# Patient Record
Sex: MALE | URBAN - METROPOLITAN AREA
[De-identification: names, ages, dates, MRNs, and addresses within clinical notes are randomized per-mention and may not be internally consistent; named-entity substitution may affect disease eponyms.]

---

## 2019-08-27 NOTE — PATIENT DISCUSSION
CHRONIC CONJUNCTIVITIS, OS: PRESCRIBE PREDNISOLONE ACETATE TID OS FOR 1 WEEK, THEN STOP. RETURN FOR FOLLOW UP IN 1 WEEK TO MONITOR FOR IMPROVEMENT OR SOONER IF SYMPTOMS WORSEN. DISCUSSED POSSIBLE NEED FOR CULTURE IF NO IMPROVEMENT. PATIENT DENIES ANY POSSIBILITY OF CONTACT WITH SEXUALLY TRANSMITTED DISEASES. STRESSED IMPORTANCE OF HAND WASHING AND AVOIDING TOUCHING THE EYE.

## 2019-08-27 NOTE — PATIENT DISCUSSION
New Prescription: prednisolone acetate (PF) (prednisolone acetate (pf)): drops,suspension: 1% 1 drop three times a day as directed into left eye 08-

## 2019-09-09 NOTE — PATIENT DISCUSSION
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 PRESCRIBED DAILY USE OF REFRESH OPTIVE ADVANCED ARTIFICIAL TEARS BID - QID, OU AND NIGHTLY REFRESH GEL OR REFRESH PM QHS OS. RETURN FOR FOLLOW-UP AS SCHEDULED OR SOONER IF SYMPTOMS WORSEN.

## 2020-10-08 ENCOUNTER — IMPORTED ENCOUNTER (OUTPATIENT)
Dept: URBAN - METROPOLITAN AREA CLINIC 1 | Facility: CLINIC | Age: 75
End: 2020-10-08

## 2020-10-08 PROBLEM — H25.813: Noted: 2020-10-08

## 2020-10-08 PROBLEM — H04.123: Noted: 2020-10-08

## 2020-10-08 PROCEDURE — 92015 DETERMINE REFRACTIVE STATE: CPT

## 2020-10-08 PROCEDURE — 92004 COMPRE OPH EXAM NEW PT 1/>: CPT

## 2020-10-08 NOTE — PATIENT DISCUSSION
1.  Cataract OU: Observe for now without intervention. The patient was advised to contact us if any change or worsening of vision2. Dry Eyes OU -Recommend ATs BID OU routinely. MRX for glasses given. Return for an appointment in 1 year 30/glare with Dr. Rivas Skinner.

## 2021-10-12 ENCOUNTER — IMPORTED ENCOUNTER (OUTPATIENT)
Dept: URBAN - METROPOLITAN AREA CLINIC 1 | Facility: CLINIC | Age: 76
End: 2021-10-12

## 2021-10-12 PROBLEM — H25.813: Noted: 2021-10-12

## 2021-10-12 PROBLEM — H04.123: Noted: 2021-10-12

## 2021-10-12 PROBLEM — H18.413: Noted: 2021-10-12

## 2021-10-12 PROCEDURE — 92015 DETERMINE REFRACTIVE STATE: CPT

## 2021-10-12 PROCEDURE — 99214 OFFICE O/P EST MOD 30 MIN: CPT

## 2021-10-12 NOTE — PATIENT DISCUSSION
1.  Cataract OU --  Visually Significant secondary to glare OU discussed the risks benefits alternatives and limitations of cataract surgery. The patient stated a full understanding and a desire to proceed with the procedure. The patient will need to return for preop appointment with cataract measurements and to have any additional questions answered and start pre-operative eye drops as directed. *Consider monofocal Toric for distance OU. Phaco PCL OS then OD Otherwise follow-up2. Dry Eyes OU -- Recommend ATs BID OU routinely. 3. Arcus OU -- Observe. Return for an appointment AScan/HP with Dr. Opal Green.

## 2021-10-25 ENCOUNTER — IMPORTED ENCOUNTER (OUTPATIENT)
Dept: URBAN - METROPOLITAN AREA CLINIC 1 | Facility: CLINIC | Age: 76
End: 2021-10-25

## 2021-10-25 PROBLEM — H25.812: Noted: 2021-10-25

## 2021-10-25 PROCEDURE — 92136 OPHTHALMIC BIOMETRY: CPT

## 2021-10-25 NOTE — PATIENT DISCUSSION
1. Cataract OS:  Visually Significant secondary to glare discussed the risks benefits alternatives and limitations of cataract surgery. The patient stated a full understanding and a desire to proceed with the procedure. Discussed with patient if PO Gtts are more than $120 for all three combined when filling at their Pharmacy please call our office to request generic substitutions. Phaco PCL OS  Lifestyle Questionnaire Completed. Return for an appointment for Return as scheduled with Dr. Oren Herron.

## 2021-11-03 ENCOUNTER — IMPORTED ENCOUNTER (OUTPATIENT)
Dept: URBAN - METROPOLITAN AREA CLINIC 1 | Facility: CLINIC | Age: 76
End: 2021-11-03

## 2021-11-04 ENCOUNTER — IMPORTED ENCOUNTER (OUTPATIENT)
Dept: URBAN - METROPOLITAN AREA CLINIC 1 | Facility: CLINIC | Age: 76
End: 2021-11-04

## 2021-11-04 PROBLEM — Z96.1: Noted: 2021-11-04

## 2021-11-04 PROCEDURE — 99024 POSTOP FOLLOW-UP VISIT: CPT

## 2021-11-04 NOTE — PATIENT DISCUSSION
1. POD#1 CE/IOL OS (Toric) doing well. *Dextenza*Use Tobradex ST BID OS Prolensa Qdaily OS: Use all three gtts through completion of PO gtt chart regimen/ Per our instructions given to patient.   Post op Warnings Reiterated RTC as scheduled

## 2021-11-15 ENCOUNTER — IMPORTED ENCOUNTER (OUTPATIENT)
Dept: URBAN - METROPOLITAN AREA CLINIC 1 | Facility: CLINIC | Age: 76
End: 2021-11-15

## 2021-11-15 PROBLEM — H25.811: Noted: 2021-11-15

## 2021-11-15 PROCEDURE — 92136 OPHTHALMIC BIOMETRY: CPT

## 2021-11-15 NOTE — PATIENT DISCUSSION
1.  Cataract OD: Visually Significant secondary to glare discussed the risks benefits alternatives and limitations of cataract surgery. The patient stated a full understanding and a desire to proceed with the procedure. Discussed with patient if PO Gtts are more than $120 for all three combined when filling at their Pharmacy please call our office to request generic substitutions. *Dextenza*Phaco PCL OD 2. POW#1  CE/IOL OS (TORIC) doing well. *Dextenza*Use gtts through completion of PO gtt chart regimen.  F/u as scheduled 2nd eye
Breathing spontaneous and unlabored. Breath sounds clear and equal bilaterally with regular rhythm.

## 2021-11-19 ENCOUNTER — IMPORTED ENCOUNTER (OUTPATIENT)
Dept: URBAN - METROPOLITAN AREA CLINIC 1 | Facility: CLINIC | Age: 76
End: 2021-11-19

## 2021-11-20 ENCOUNTER — IMPORTED ENCOUNTER (OUTPATIENT)
Dept: URBAN - METROPOLITAN AREA CLINIC 1 | Facility: CLINIC | Age: 76
End: 2021-11-20

## 2021-11-20 PROBLEM — Z96.1: Noted: 2021-11-20

## 2021-11-20 PROCEDURE — 99024 POSTOP FOLLOW-UP VISIT: CPT

## 2021-11-20 NOTE — PATIENT DISCUSSION
1. POD#1 Phaco/ PCL OD (Toric) -- doing well. *Dexenza in placeUse Tobradex ST BID OD & Prolensa QD OD: Use all three gtts through completion of PO gtt chart regimen/ Per our instructions given. Post op Warnings Reiterated 2.  POW#3 Phaco/ PCL OS (Toric)- doing well Use Prolensa QD OSRTC as scheduled

## 2021-12-14 ENCOUNTER — IMPORTED ENCOUNTER (OUTPATIENT)
Dept: URBAN - METROPOLITAN AREA CLINIC 1 | Facility: CLINIC | Age: 76
End: 2021-12-14

## 2021-12-14 PROBLEM — Z09: Noted: 2021-12-14

## 2021-12-14 PROCEDURE — 99024 POSTOP FOLLOW-UP VISIT: CPT

## 2021-12-14 NOTE — PATIENT DISCUSSION
POW#3 Phaco/ PCL Toric OUFinish PO meds per PO gtt schedule MRX for glasses givenReturn for an appointment in October for a 27 with Dr. Beni Calrk.

## 2022-04-02 ASSESSMENT — VISUAL ACUITY
OD_SC: 20/50
OS_CC: 20/100
OD_GLARE: 20/60
OD_CC: 20/70
OD_GLARE: 20/60
OD_GLARE: 20/60
OS_CC: 20/20-1
OD_CC: J1
OD_SC: J2
OS_SC: 20/20
OS_GLARE: 20/80
OD_GLARE: 20/60
OD_CC: 20/20-2
OS_GLARE: 20/50
OS_CC: J2
OS_CC: J1
OS_CC: 20/20-2
OD_SC: 20/40
OD_CC: J3
OS_GLARE: 20/80
OS_SC: J2
OD_SC: 20/40
OS_CC: J2
OS_SC: 20/40+2
OS_CC: 20/20-1
OD_CC: 20/20
OD_CC: J1
OS_CC: 20/100
OD_CC: 20/70
OS_CC: 20/20
OS_SC: 20/40+2
OD_CC: 20/70

## 2022-04-02 ASSESSMENT — KERATOMETRY
OD_K2POWER_DIOPTERS: 45.00
OD_AXISANGLE2_DEGREES: 054
OS_K1POWER_DIOPTERS: 44.00
OD_AXISANGLE_DEGREES: 144
OS_K2POWER_DIOPTERS: 44.75
OS_AXISANGLE_DEGREES: 025
OS_AXISANGLE2_DEGREES: 115
OD_K1POWER_DIOPTERS: 44.75

## 2022-04-02 ASSESSMENT — TONOMETRY
OS_IOP_MMHG: 18
OS_IOP_MMHG: 16
OD_IOP_MMHG: 18
OS_IOP_MMHG: 14
OS_IOP_MMHG: 16
OD_IOP_MMHG: 18
OD_IOP_MMHG: 19
OD_IOP_MMHG: 19
OS_IOP_MMHG: 17
OD_IOP_MMHG: 18
OS_IOP_MMHG: 16

## 2022-10-21 ENCOUNTER — COMPREHENSIVE EXAM (OUTPATIENT)
Dept: URBAN - METROPOLITAN AREA CLINIC 1 | Facility: CLINIC | Age: 77
End: 2022-10-21

## 2022-10-21 DIAGNOSIS — H26.493: ICD-10-CM

## 2022-10-21 DIAGNOSIS — H18.413: ICD-10-CM

## 2022-10-21 DIAGNOSIS — H04.123: ICD-10-CM

## 2022-10-21 DIAGNOSIS — H35.362: ICD-10-CM

## 2022-10-21 DIAGNOSIS — H43.813: ICD-10-CM

## 2022-10-21 PROCEDURE — 99214 OFFICE O/P EST MOD 30 MIN: CPT

## 2022-10-21 ASSESSMENT — KERATOMETRY
OS_K2POWER_DIOPTERS: 44.75
OS_AXISANGLE_DEGREES: 025
OD_AXISANGLE_DEGREES: 144
OD_K2POWER_DIOPTERS: 45.00
OD_K1POWER_DIOPTERS: 44.75
OS_AXISANGLE2_DEGREES: 115
OD_AXISANGLE2_DEGREES: 054
OS_K1POWER_DIOPTERS: 44.00

## 2022-10-21 ASSESSMENT — VISUAL ACUITY
OD_SC: 20/20
OS_SC: 20/25

## 2022-10-21 ASSESSMENT — TONOMETRY
OS_IOP_MMHG: 12
OD_IOP_MMHG: 12

## 2023-12-04 ENCOUNTER — COMPREHENSIVE EXAM (OUTPATIENT)
Dept: URBAN - METROPOLITAN AREA CLINIC 1 | Facility: CLINIC | Age: 78
End: 2023-12-04

## 2023-12-04 DIAGNOSIS — H04.123: ICD-10-CM

## 2023-12-04 DIAGNOSIS — H01.02A: ICD-10-CM

## 2023-12-04 DIAGNOSIS — H43.813: ICD-10-CM

## 2023-12-04 DIAGNOSIS — H01.02B: ICD-10-CM

## 2023-12-04 DIAGNOSIS — H26.493: ICD-10-CM

## 2023-12-04 DIAGNOSIS — H18.413: ICD-10-CM

## 2023-12-04 DIAGNOSIS — H35.362: ICD-10-CM

## 2023-12-04 PROCEDURE — 92015 DETERMINE REFRACTIVE STATE: CPT

## 2023-12-04 PROCEDURE — 99214 OFFICE O/P EST MOD 30 MIN: CPT

## 2023-12-04 ASSESSMENT — VISUAL ACUITY
OD_SC: J3
OD_SC: 20/20-1
OS_SC: J3
OS_SC: 20/30-2

## 2023-12-04 ASSESSMENT — KERATOMETRY
OD_K2POWER_DIOPTERS: 45.00
OS_K2POWER_DIOPTERS: 44.75
OD_AXISANGLE_DEGREES: 144
OS_AXISANGLE_DEGREES: 025
OS_AXISANGLE2_DEGREES: 115
OD_K1POWER_DIOPTERS: 44.75
OD_AXISANGLE2_DEGREES: 054
OS_K1POWER_DIOPTERS: 44.00

## 2023-12-04 ASSESSMENT — TONOMETRY
OS_IOP_MMHG: 10
OD_IOP_MMHG: 10

## 2024-12-05 ENCOUNTER — COMPREHENSIVE EXAM (OUTPATIENT)
Age: 79
End: 2024-12-05

## 2024-12-05 DIAGNOSIS — H35.362: ICD-10-CM

## 2024-12-05 DIAGNOSIS — H26.493: ICD-10-CM

## 2024-12-05 DIAGNOSIS — H18.413: ICD-10-CM

## 2024-12-05 DIAGNOSIS — H04.123: ICD-10-CM

## 2024-12-05 DIAGNOSIS — H43.813: ICD-10-CM

## 2024-12-05 PROCEDURE — 99214 OFFICE O/P EST MOD 30 MIN: CPT
